# Patient Record
Sex: MALE | Race: ASIAN | NOT HISPANIC OR LATINO
[De-identification: names, ages, dates, MRNs, and addresses within clinical notes are randomized per-mention and may not be internally consistent; named-entity substitution may affect disease eponyms.]

---

## 2018-09-18 ENCOUNTER — APPOINTMENT (OUTPATIENT)
Dept: UROLOGY | Facility: CLINIC | Age: 83
End: 2018-09-18
Payer: MEDICARE

## 2018-09-18 VITALS
OXYGEN SATURATION: 93 % | BODY MASS INDEX: 22.6 KG/M2 | WEIGHT: 134 LBS | HEART RATE: 75 BPM | RESPIRATION RATE: 17 BRPM | DIASTOLIC BLOOD PRESSURE: 77 MMHG | TEMPERATURE: 98.9 F | SYSTOLIC BLOOD PRESSURE: 146 MMHG | HEIGHT: 64.5 IN

## 2018-09-18 PROCEDURE — 99204 OFFICE O/P NEW MOD 45 MIN: CPT

## 2018-09-18 RX ORDER — FINASTERIDE 5 MG/1
5 TABLET, FILM COATED ORAL
Refills: 0 | Status: ACTIVE | COMMUNITY

## 2018-09-18 RX ORDER — TAMSULOSIN HYDROCHLORIDE 0.4 MG/1
0.4 CAPSULE ORAL
Refills: 0 | Status: ACTIVE | COMMUNITY

## 2018-09-19 LAB
APPEARANCE: CLEAR
BACTERIA: NEGATIVE
BILIRUBIN URINE: NEGATIVE
BLOOD URINE: NEGATIVE
COLOR: YELLOW
GLUCOSE QUALITATIVE U: NEGATIVE MG/DL
HYALINE CASTS: 0 /LPF
KETONES URINE: NEGATIVE
LEUKOCYTE ESTERASE URINE: NEGATIVE
MICROSCOPIC-UA: NORMAL
NITRITE URINE: NEGATIVE
PH URINE: 5.5
PROTEIN URINE: NEGATIVE MG/DL
RED BLOOD CELLS URINE: 1 /HPF
SPECIFIC GRAVITY URINE: 1.02
SQUAMOUS EPITHELIAL CELLS: 0 /HPF
UROBILINOGEN URINE: NEGATIVE MG/DL
WHITE BLOOD CELLS URINE: 0 /HPF

## 2018-09-21 LAB — CORE LAB FLUID CYTOLOGY: NORMAL

## 2018-09-28 ENCOUNTER — APPOINTMENT (OUTPATIENT)
Dept: UROLOGY | Facility: CLINIC | Age: 83
End: 2018-09-28
Payer: MEDICARE

## 2018-09-28 VITALS
SYSTOLIC BLOOD PRESSURE: 125 MMHG | TEMPERATURE: 98.1 F | OXYGEN SATURATION: 94 % | WEIGHT: 131 LBS | HEART RATE: 70 BPM | RESPIRATION RATE: 17 BRPM | DIASTOLIC BLOOD PRESSURE: 73 MMHG | BODY MASS INDEX: 22.14 KG/M2

## 2018-09-28 PROCEDURE — 52000 CYSTOURETHROSCOPY: CPT

## 2018-09-28 PROCEDURE — 99213 OFFICE O/P EST LOW 20 MIN: CPT | Mod: 25

## 2018-09-28 RX ORDER — CIPROFLOXACIN HYDROCHLORIDE 500 MG/1
500 TABLET, FILM COATED ORAL
Qty: 2 | Refills: 0 | Status: ACTIVE | COMMUNITY
Start: 2018-09-28

## 2018-09-28 RX ORDER — CIPROFLOXACIN HYDROCHLORIDE 500 MG/1
500 TABLET, FILM COATED ORAL
Refills: 0 | Status: COMPLETED | OUTPATIENT
Start: 2018-09-28

## 2018-09-28 RX ADMIN — CIPROFLOXACIN HYDROCHLORIDE 0 MG: 500 TABLET, FILM COATED ORAL at 00:00

## 2018-10-01 LAB
ANION GAP SERPL CALC-SCNC: 14 MMOL/L
BUN SERPL-MCNC: 23 MG/DL
CALCIUM SERPL-MCNC: 9.2 MG/DL
CHLORIDE SERPL-SCNC: 104 MMOL/L
CO2 SERPL-SCNC: 23 MMOL/L
CREAT SERPL-MCNC: 1.3 MG/DL
GLUCOSE SERPL-MCNC: 81 MG/DL
POTASSIUM SERPL-SCNC: 4.6 MMOL/L
SODIUM SERPL-SCNC: 141 MMOL/L

## 2018-10-18 ENCOUNTER — APPOINTMENT (OUTPATIENT)
Dept: CARDIOLOGY | Facility: CLINIC | Age: 83
End: 2018-10-18
Payer: MEDICARE

## 2018-10-18 ENCOUNTER — NON-APPOINTMENT (OUTPATIENT)
Age: 83
End: 2018-10-18

## 2018-10-18 VITALS
WEIGHT: 133 LBS | TEMPERATURE: 98.2 F | BODY MASS INDEX: 22.48 KG/M2 | OXYGEN SATURATION: 96 % | RESPIRATION RATE: 18 BRPM | DIASTOLIC BLOOD PRESSURE: 72 MMHG | HEART RATE: 56 BPM | SYSTOLIC BLOOD PRESSURE: 122 MMHG

## 2018-10-18 DIAGNOSIS — N40.0 BENIGN PROSTATIC HYPERPLASIA WITHOUT LOWER URINARY TRACT SYMPMS: ICD-10-CM

## 2018-10-18 DIAGNOSIS — Z87.891 PERSONAL HISTORY OF NICOTINE DEPENDENCE: ICD-10-CM

## 2018-10-18 DIAGNOSIS — E78.5 HYPERLIPIDEMIA, UNSPECIFIED: ICD-10-CM

## 2018-10-18 PROCEDURE — 93224 XTRNL ECG REC UP TO 48 HRS: CPT

## 2018-10-18 PROCEDURE — 93015 CV STRESS TEST SUPVJ I&R: CPT

## 2018-10-18 PROCEDURE — 93306 TTE W/DOPPLER COMPLETE: CPT

## 2018-10-18 PROCEDURE — ZZZZZ: CPT

## 2018-10-18 PROCEDURE — 93000 ELECTROCARDIOGRAM COMPLETE: CPT | Mod: 59

## 2018-10-18 PROCEDURE — 99204 OFFICE O/P NEW MOD 45 MIN: CPT | Mod: 25

## 2018-10-23 ENCOUNTER — NON-APPOINTMENT (OUTPATIENT)
Age: 83
End: 2018-10-23

## 2018-10-26 ENCOUNTER — RESULT REVIEW (OUTPATIENT)
Age: 83
End: 2018-10-26

## 2020-07-31 ENCOUNTER — APPOINTMENT (OUTPATIENT)
Dept: UROLOGY | Facility: CLINIC | Age: 85
End: 2020-07-31
Payer: MEDICARE

## 2020-07-31 VITALS
OXYGEN SATURATION: 96 % | RESPIRATION RATE: 18 BRPM | BODY MASS INDEX: 22.65 KG/M2 | HEART RATE: 44 BPM | DIASTOLIC BLOOD PRESSURE: 72 MMHG | WEIGHT: 134 LBS | SYSTOLIC BLOOD PRESSURE: 160 MMHG | TEMPERATURE: 97.5 F

## 2020-07-31 DIAGNOSIS — R07.89 OTHER CHEST PAIN: ICD-10-CM

## 2020-07-31 DIAGNOSIS — R00.1 BRADYCARDIA, UNSPECIFIED: ICD-10-CM

## 2020-07-31 DIAGNOSIS — N28.89 OTHER SPECIFIED DISORDERS OF KIDNEY AND URETER: ICD-10-CM

## 2020-07-31 PROCEDURE — 99214 OFFICE O/P EST MOD 30 MIN: CPT

## 2020-08-02 PROBLEM — R07.89 CHEST DISCOMFORT: Status: ACTIVE | Noted: 2018-10-18

## 2020-08-02 PROBLEM — N28.89 RIGHT RENAL MASS: Status: ACTIVE | Noted: 2020-07-31

## 2020-08-02 PROBLEM — R00.1 BRADYCARDIA: Status: ACTIVE | Noted: 2018-10-18

## 2020-08-02 LAB
ANION GAP SERPL CALC-SCNC: 15 MMOL/L
BUN SERPL-MCNC: 33 MG/DL
CALCIUM SERPL-MCNC: 9.5 MG/DL
CHLORIDE SERPL-SCNC: 108 MMOL/L
CO2 SERPL-SCNC: 23 MMOL/L
CREAT SERPL-MCNC: 1.43 MG/DL
GLUCOSE SERPL-MCNC: 78 MG/DL
POTASSIUM SERPL-SCNC: 6 MMOL/L
PSA FREE FLD-MCNC: 23 %
PSA FREE SERPL-MCNC: 0.64 NG/ML
PSA SERPL-MCNC: 2.85 NG/ML
SODIUM SERPL-SCNC: 146 MMOL/L

## 2020-08-02 NOTE — LETTER BODY
[FreeTextEntry1] : Sancho Lopez MD\par [address]\[facility] [place] [address]\par [phone] (W)\par \par \par Dear Dr. Lopez,\par \par Reason for visit: Abnormal imaging.  Right renal mass\par \par This is a 87 year year old gentleman with abnormal abdominal imaging. Patient underwent CT scan and MRI which demonstrated right renal mass.  Patient denies any flank pain, hematuria, or urinary difficulties.  Patient is accompanied by his wife today.  Patient denies any urinary incontinence. The patient denies any aggravating or relieving factors. The patient denies any interference of function. The patient is entirely asymptomatic. All other review of systems are negative. Past medical history, social history, and family history were inquired and were noncontributory to patient condition.. Medications and allergies were reviewed.\par \par On examination, the patient is a older appearing gentleman in no acute distress. He is alert and oriented follows commands. He has normal mood and affect. He is normocephalic. Neck is supple. Respirations are unlabored. His abdomen is soft and nontender. Bladder is nonpalpable. No CVA tenderness. Neurologically he is grossly intact. No peripheral edema. Skin without gross abnormality. He has normal male external genitalia. Normal meatus. Bilateral testes are descended intrascrotally and normal to palpation. On rectal examination, there is normal sphincter tone. The prostate is clinically benign without focal induration or nodularity.\par \par I personally reviewed the CT scan with patient today. CT/MRi  scan demonstrated 2.7 cm right upper pole mass.  Previously the lesion was identified on renal US but not reporteed on CT urogram. \par \par Assessment: Abnormal urinary imaging. Right renal mass.\par \par I counseled the patient.  I reviewed the images with the patient today.  The patient has a 2 cm right renal mass in 2018 that is now 2.7 cm.  The lesion has a slow rate of growth.  I discussed the risk of occult malignancy.  I discussed discussed the options of all to him including continued surveillance as well consideration for partial kidney removal.  Given his age, patient defers treatment currently.  He wishes to return in 3 months for repeat renal ultrasound.  The risks and benefits were discussed. Alternatives were given. I answered the patient questions. The patient will take the necessary preparations for the procedure. The patient will follow-up as directed and will contact me with any questions or concerns. \par \par Plan: Follow-up in 3 months for renal ultrasound.\par

## 2020-08-05 LAB
ANION GAP SERPL CALC-SCNC: 12 MMOL/L
BUN SERPL-MCNC: 27 MG/DL
CALCIUM SERPL-MCNC: 8.7 MG/DL
CHLORIDE SERPL-SCNC: 108 MMOL/L
CO2 SERPL-SCNC: 22 MMOL/L
CREAT SERPL-MCNC: 1.46 MG/DL
GLUCOSE SERPL-MCNC: 137 MG/DL
POTASSIUM SERPL-SCNC: 4.4 MMOL/L
SODIUM SERPL-SCNC: 143 MMOL/L

## 2020-11-03 ENCOUNTER — APPOINTMENT (OUTPATIENT)
Dept: UROLOGY | Facility: CLINIC | Age: 85
End: 2020-11-03

## 2020-11-10 ENCOUNTER — NON-APPOINTMENT (OUTPATIENT)
Age: 85
End: 2020-11-10

## 2020-11-20 PROBLEM — E78.5 HLD (HYPERLIPIDEMIA): Status: ACTIVE | Noted: 2020-11-20

## 2020-11-20 PROBLEM — N40.0 BPH (BENIGN PROSTATIC HYPERPLASIA): Status: ACTIVE | Noted: 2020-11-20

## 2020-11-20 PROBLEM — Z87.891 FORMER SMOKER: Status: ACTIVE | Noted: 2020-11-20

## 2020-12-07 NOTE — DISCUSSION/SUMMARY
[FreeTextEntry1] : 85 year-old male with BPH presents for evaluation of CP, SOB, and dizziness.  Patient reports chest discomfort with cold weather.  Patient reports feeling tired and dizzy as if not enough oxygen.  Patient reports SOB on exertion.  Patient reports one episode of syncope when he was very young from not eating.  No hx of HTN, DM.  Patient is s/p gastrectomy, hemorrhoidectomy.  He is on meds for BPH and HLD.  He is a former smoker, no alcohol, fam hx unknown.  Patient reports not that his balance is not very good.  I advised patient to undergo an echocardiogram and a treadmill stress test. \par \par (1) Bradycardia, second degree AV block, CP, SOB - Patient underwent an echocardiogram and it showed normal LV function without significant valvular pathology. Patient underwent a treadmill stress test and completed 5 minutes of Norman protocol.  There were upsloping ST depressions on ECG but no symptoms.  His HR elise appropriately on the treadmill.  Patient wore a Holter and it showed average HR of 62, APC's, PVC's, and short runs of PAT (6 beats the longest).  Patient was reassured.  PPM is not needed at this time but may be needed in the future.\par \par (2) Followup - as needed.

## 2020-12-07 NOTE — HISTORY OF PRESENT ILLNESS
[FreeTextEntry1] : 87 year-old male with BPH presents for followup.  Patient was last seen on 10/18/18 for evaluation of bradycardia, second degree AV block, CP, SOB, and dizziness.  Patient underwent an echocardiogram and it showed normal LV function without significant valvular pathology. Patient underwent a treadmill stress test and completed 5 minutes of Norman protocol. There were upsloping ST depressions on ECG but no symptoms. His HR elise appropriately on the treadmill.  Patient wore a Holter and it showed average HR of 62, APC's, PVC's, and short runs of PAT (6 beats the longest).  PPM was not felt to be needed at the time.\par \par ------------------------------------------------------------------------------------------------------------- \par previous visit summary:\par \par (1) Bradycardia, second degree AV block, CP, SOB - Patient underwent an echocardiogram and it showed normal LV function without significant valvular pathology. Patient underwent a treadmill stress test and completed 5 minutes of Norman protocol. There were upsloping ST depressions on ECG but no symptoms. His HR elise appropriately on the treadmill. Patient wore a Holter and it showed average HR of 62, APC's, PVC's, and short runs of PAT (6 beats the longest). Patient was reassured. PPM is not needed at this time but may be needed in the future.\par \par (2) Followup - as needed. \par \par Old note - \par \par Patient reports chest discomfort with cold weather.  Patient reports feeling tired and dizzy as if not enough oxygen.  Patient reports SOB on exertion.  Patient reports one episode of syncope when he was very young from not eating.  No hx of HTN, DM.  Patient is s/p gastrectomy, hemorrhoidectomy.  He is on meds for BPH and HLD.  He is a former smoker, no alcohol, fam hx unknown.  Patient reports not that his balance is not very good.  I advised patient to undergo an echocardiogram and a treadmill stress test.

## 2020-12-07 NOTE — PHYSICAL EXAM
[General Appearance - Well Developed] : well developed [Normal Appearance] : normal appearance [Well Groomed] : well groomed [General Appearance - Well Nourished] : well nourished [No Deformities] : no deformities [General Appearance - In No Acute Distress] : no acute distress [Normal Conjunctiva] : the conjunctiva exhibited no abnormalities [Eyelids - No Xanthelasma] : the eyelids demonstrated no xanthelasmas [Normal Oral Mucosa] : normal oral mucosa [No Oral Pallor] : no oral pallor [No Oral Cyanosis] : no oral cyanosis [Normal Jugular Venous A Waves Present] : normal jugular venous A waves present [Normal Jugular Venous V Waves Present] : normal jugular venous V waves present [No Jugular Venous Lance A Waves] : no jugular venous lance A waves [Respiration, Rhythm And Depth] : normal respiratory rhythm and effort [Exaggerated Use Of Accessory Muscles For Inspiration] : no accessory muscle use [Auscultation Breath Sounds / Voice Sounds] : lungs were clear to auscultation bilaterally [Heart Rate And Rhythm] : heart rate and rhythm were normal [Heart Sounds] : normal S1 and S2 [Murmurs] : no murmurs present [Arterial Pulses Normal] : the arterial pulses were normal [FreeTextEntry1] : Minimal LE edema noted. [Abdomen Soft] : soft [Abdomen Tenderness] : non-tender [Abdomen Mass (___ Cm)] : no abdominal mass palpated [Abnormal Walk] : normal gait [Gait - Sufficient For Exercise Testing] : the gait was sufficient for exercise testing [Nail Clubbing] : no clubbing of the fingernails [Cyanosis, Localized] : no localized cyanosis [Petechial Hemorrhages (___cm)] : no petechial hemorrhages [] : no ischemic changes [Oriented To Time, Place, And Person] : oriented to person, place, and time [Affect] : the affect was normal [Mood] : the mood was normal [No Anxiety] : not feeling anxious

## 2020-12-08 ENCOUNTER — APPOINTMENT (OUTPATIENT)
Dept: UROLOGY | Facility: CLINIC | Age: 85
End: 2020-12-08
Payer: MEDICARE

## 2020-12-08 ENCOUNTER — APPOINTMENT (OUTPATIENT)
Dept: CARDIOLOGY | Facility: CLINIC | Age: 85
End: 2020-12-08

## 2020-12-08 VITALS
WEIGHT: 143 LBS | SYSTOLIC BLOOD PRESSURE: 133 MMHG | TEMPERATURE: 97.8 F | BODY MASS INDEX: 24.17 KG/M2 | HEART RATE: 62 BPM | RESPIRATION RATE: 18 BRPM | OXYGEN SATURATION: 95 % | DIASTOLIC BLOOD PRESSURE: 81 MMHG

## 2020-12-08 PROCEDURE — 99072 ADDL SUPL MATRL&STAF TM PHE: CPT

## 2020-12-08 PROCEDURE — 99214 OFFICE O/P EST MOD 30 MIN: CPT

## 2020-12-09 NOTE — LETTER BODY
[FreeTextEntry1] : Sancho Lopez MD\par [address]\[place] [address]\par [phone] (W)\par \par \par Dear Dr. Lopez,\par \par Reason for visit: Abnormal imaging. Right renal mass\par \par This is a 87 year-old gentleman with abnormal abdominal imaging. Patient underwent CT scan and MRI in July, which demonstrated a right renal mass. He returns today for follow-up. Since he was last seen, the patient underwent kidneys MRI in Nov, which demonstrated a 2.8 cm right renal mass, minimally larger from previous study. Patient denies any flank pain, hematuria, or urinary difficulties. Patient denies any urinary incontinence. The patient is entirely asymptomatic. All other review of systems are negative. Past medical history, social history, and family history were unchanged. Medications and allergies were reviewed.\par \par On examination, the patient is a older appearing gentleman in no acute distress. He is alert and oriented follows commands. He has normal mood and affect. He is normocephalic. Neck is supple. Respirations are unlabored. His abdomen is soft and nontender. Bladder is nonpalpable. No CVA tenderness. Neurologically he is grossly intact. No peripheral edema. Skin without gross abnormality. He has normal male external genitalia. Normal meatus. Bilateral testes are descended intrascrotally and normal to palpation. On rectal examination, there is normal sphincter tone. The prostate is clinically benign without focal induration or nodularity.\par \par His BMP in August demonstrated decreased renal functions, creatinine 1.46. His PSA in July was 2.85, which is within normal limits.\par \par His kidneys MRI in November demonstrated a 2.8 cm enhancing mass in the upper pole of the right kidney, minimally larger from previous study, consistent with a renal cell carcinoma.\par \par Assessment: Abnormal urinary imaging. Right renal mass, stable.\par \par I counseled the patient. I reassured the patient. I discussed with the patient that his recent kidneys MRI demonstrated that his right renal mass is stable. I recommended the patient follow-up in six months for renal ultrasound to ensure stability. Risks and alternatives were discussed. I answered the patient questions. The patient will follow-up as directed and will contact me with any questions or concerns. Thank you for the opportunity to participate in the care of Mr. BALLESTEROS. I will keep you updated on his progress.\par \par Plan: Follow-up in 6 months for renal US.

## 2020-12-09 NOTE — ADDENDUM
[FreeTextEntry1] : Entered by Brandon Neri, acting as scribe for Dr. Taj Sarah.\par \par The documentation recorded by the scribe accurately reflects the service I personally performed and the decisions made by me.\par

## 2020-12-27 DIAGNOSIS — Z00.00 ENCOUNTER FOR GENERAL ADULT MEDICAL EXAMINATION W/OUT ABNORMAL FINDINGS: ICD-10-CM

## 2021-06-11 ENCOUNTER — APPOINTMENT (OUTPATIENT)
Dept: UROLOGY | Facility: CLINIC | Age: 86
End: 2021-06-11

## 2022-08-23 ENCOUNTER — APPOINTMENT (OUTPATIENT)
Dept: UROLOGY | Facility: CLINIC | Age: 87
End: 2022-08-23

## 2022-08-23 DIAGNOSIS — R31.21 ASYMPTOMATIC MICROSCOPIC HEMATURIA: ICD-10-CM

## 2022-08-23 PROCEDURE — 76775 US EXAM ABDO BACK WALL LIM: CPT

## 2022-08-23 PROCEDURE — 99214 OFFICE O/P EST MOD 30 MIN: CPT

## 2022-08-23 NOTE — HISTORY OF PRESENT ILLNESS
[FreeTextEntry1] : FUA renal mass\par renal mass stable \par \par Followup in 1 year renal US \par \par Please refer to URO Consult note

## 2022-08-23 NOTE — ADDENDUM
[FreeTextEntry1] : Entered by MATTHEW THOMAS, acting as scribe for Dr. Taj Sarah.\par The documentation recorded by the scribe accurately reflects the service I personally performed and the decisions made by me.

## 2022-08-23 NOTE — LETTER BODY
[FreeTextEntry1] : Sancho Lopez MD\par [address]\[place] [address]\par [phone] (W)\par \par \par Dear Dr. Lopez,\par \par Reason for visit: Abnormal imaging. Right renal mass\par \par This is a 89 year-old gentleman with abnormal abdominal imaging. Patient underwent CT scan and MRI in July, which demonstrated a right renal mass. He returns today for follow-up. His kidneys MRI in November 2020 demonstrated a 2.8 cm enhancing mass in the upper pole of the right kidney. The patient returns today for follow up. Since he was last seen, is doing well. Patient denies any flank pain, hematuria, or urinary difficulties. Patient denies any urinary incontinence. The patient is entirely asymptomatic. All other review of systems are negative. Past medical history, social history, and family history were unchanged. Medications and allergies were reviewed.\par \par On examination, the patient is a older appearing gentleman in no acute distress. He is alert and oriented follows commands. He has normal mood and affect. He is normocephalic. Neck is supple. Respirations are unlabored. His abdomen is soft and nontender. Bladder is nonpalpable. No CVA tenderness. Neurologically he is grossly intact. No peripheral edema. Skin without gross abnormality. He has normal male external genitalia. Normal meatus. Bilateral testes are descended intrascrotally and normal to palpation. On rectal examination, there is normal sphincter tone. The prostate is clinically benign without focal induration or nodularity.\par \par I personally reviewed ultrasound images with the patient today and images again demonstrated a 2.5 cm solid mass noted in the right kidney upper pole, previous MRI 2.8 cm. Both kidneys appear normal in size and echogenicity. No stones or hydronephrosis visualized.\par \par Assessment: Abnormal urinary imaging. Right renal mass, stable.\par \par I counseled the patient. I reassured the patient. I discussed with the patient that his ultrasound demonstrated that his right renal mass is stable. I recommended the patient follow-up in 1 year for renal ultrasound to ensure stability. Risks and alternatives were discussed. I answered the patient questions. The patient will follow-up as directed and will contact me with any questions or concerns. Thank you for the opportunity to participate in the care of Mr. BALLESTEROS. I will keep you updated on his progress.\par \par Plan: Follow-up in 1 year for renal US.

## 2022-08-23 NOTE — LETTER BODY
[FreeTextEntry1] : Sancho Lopez MD\par [address]\[place] [address]\par [phone] (W)\par \par \par Dear Dr. Lopez,\par \par Reason for visit: Abnormal imaging. Right renal mass\par \par This is a 89 year-old gentleman with abnormal abdominal imaging. Patient underwent CT scan and MRI in July, which demonstrated a right renal mass. He returns today for follow-up. His kidneys MRI in November 2020 demonstrated a 2.8 cm enhancing mass in the upper pole of the right kidney. The patient returns today for follow up. Since he was last seen, is doing well. Patient denies any flank pain, hematuria, or urinary difficulties. Patient denies any urinary incontinence. The patient is entirely asymptomatic. All other review of systems are negative. Past medical history, social history, and family history were unchanged. Medications and allergies were reviewed.\par \par On examination, the patient is a older appearing gentleman in no acute distress. He is alert and oriented follows commands. He has normal mood and affect. He is normocephalic. Neck is supple. Respirations are unlabored. His abdomen is soft and nontender. Bladder is nonpalpable. No CVA tenderness. Neurologically he is grossly intact. No peripheral edema. Skin without gross abnormality. He has normal male external genitalia. Normal meatus. Bilateral testes are descended intrascrotally and normal to palpation. On rectal examination, there is normal sphincter tone. The prostate is clinically benign without focal induration or nodularity.\par \par I personally reviewed ultrasound images with the patient today and images again demonstrated a 2.5 cm solid mass noted in the right kidney upper pole, previous MRI 2.8 cm. Both kidneys appear normal in size and echogenicity. No stones or hydronephrosis visualized.\par \par Assessment: Abnormal urinary imaging. Right renal mass, stable.\par \par I counseled the patient. I reassured the patient. I discussed with the patient that his ultrasound demonstrated that his right renal mass is stable. I recommended the patient follow-up in 1 year for renal ultrasound to ensure stability. Risks and alternatives were discussed. I answered the patient questions. The patient will follow-up as directed and will contact me with any questions or concerns. Thank you for the opportunity to participate in the care of Mr. BALLESTEROS. I will keep you updated on his progress.\par \par Plan: Follow-up in 1 year for renal US.

## 2022-08-28 LAB
APPEARANCE: CLEAR
BACTERIA: NEGATIVE
BILIRUBIN URINE: NEGATIVE
BLOOD URINE: NEGATIVE
COLOR: YELLOW
GLUCOSE QUALITATIVE U: NEGATIVE
HYALINE CASTS: 0 /LPF
KETONES URINE: NEGATIVE
LEUKOCYTE ESTERASE URINE: NEGATIVE
MICROSCOPIC-UA: NORMAL
NITRITE URINE: NEGATIVE
PH URINE: 7
PROTEIN URINE: NORMAL
RED BLOOD CELLS URINE: 0 /HPF
SPECIFIC GRAVITY URINE: 1.02
SQUAMOUS EPITHELIAL CELLS: 0 /HPF
URINE CYTOLOGY: NORMAL
UROBILINOGEN URINE: NORMAL
WHITE BLOOD CELLS URINE: 0 /HPF

## 2025-04-23 ENCOUNTER — NON-APPOINTMENT (OUTPATIENT)
Age: 89
End: 2025-04-23

## 2025-04-25 ENCOUNTER — APPOINTMENT (OUTPATIENT)
Dept: UROLOGY | Facility: CLINIC | Age: 89
End: 2025-04-25
Payer: MEDICARE

## 2025-04-25 VITALS
HEART RATE: 69 BPM | SYSTOLIC BLOOD PRESSURE: 145 MMHG | DIASTOLIC BLOOD PRESSURE: 78 MMHG | TEMPERATURE: 97.5 F | OXYGEN SATURATION: 94 % | BODY MASS INDEX: 21.46 KG/M2 | WEIGHT: 127 LBS | RESPIRATION RATE: 18 BRPM

## 2025-04-25 DIAGNOSIS — N40.0 BENIGN PROSTATIC HYPERPLASIA WITHOUT LOWER URINARY TRACT SYMPMS: ICD-10-CM

## 2025-04-25 DIAGNOSIS — R31.21 ASYMPTOMATIC MICROSCOPIC HEMATURIA: ICD-10-CM

## 2025-04-25 PROCEDURE — 76775 US EXAM ABDO BACK WALL LIM: CPT

## 2025-04-25 PROCEDURE — 99214 OFFICE O/P EST MOD 30 MIN: CPT

## 2025-04-25 PROCEDURE — G2211 COMPLEX E/M VISIT ADD ON: CPT

## 2025-04-26 LAB
ANION GAP SERPL CALC-SCNC: 13 MMOL/L
APPEARANCE: CLEAR
BACTERIA: NEGATIVE /HPF
BILIRUBIN URINE: NEGATIVE
BLOOD URINE: NEGATIVE
BUN SERPL-MCNC: 23 MG/DL
CALCIUM SERPL-MCNC: 9.2 MG/DL
CAST: 1 /LPF
CHLORIDE SERPL-SCNC: 110 MMOL/L
CO2 SERPL-SCNC: 24 MMOL/L
COLOR: YELLOW
CREAT SERPL-MCNC: 1.44 MG/DL
EGFRCR SERPLBLD CKD-EPI 2021: 46 ML/MIN/1.73M2
EPITHELIAL CELLS: 0 /HPF
GLUCOSE QUALITATIVE U: NEGATIVE MG/DL
GLUCOSE SERPL-MCNC: 74 MG/DL
KETONES URINE: NEGATIVE MG/DL
LEUKOCYTE ESTERASE URINE: NEGATIVE
MICROSCOPIC-UA: NORMAL
NITRITE URINE: NEGATIVE
PH URINE: 6
POTASSIUM SERPL-SCNC: 5 MMOL/L
PROTEIN URINE: NORMAL MG/DL
PSA FREE FLD-MCNC: 34 %
PSA FREE SERPL-MCNC: 0.48 NG/ML
PSA SERPL-MCNC: 1.42 NG/ML
RED BLOOD CELLS URINE: 0 /HPF
SODIUM SERPL-SCNC: 147 MMOL/L
SPECIFIC GRAVITY URINE: 1.02
UROBILINOGEN URINE: 1 MG/DL
WHITE BLOOD CELLS URINE: 0 /HPF

## 2025-04-29 LAB — URINE CYTOLOGY: NORMAL

## 2025-05-14 ENCOUNTER — NON-APPOINTMENT (OUTPATIENT)
Age: 89
End: 2025-05-14